# Patient Record
Sex: FEMALE | Race: WHITE | Employment: FULL TIME | URBAN - METROPOLITAN AREA
[De-identification: names, ages, dates, MRNs, and addresses within clinical notes are randomized per-mention and may not be internally consistent; named-entity substitution may affect disease eponyms.]

---

## 2018-10-22 NOTE — PROGRESS NOTES
[address] [facility]  THERAPY AT [facility], [address] [facility] [address] - Phone: [phone]  Fax: [phone] / [address] [facility]  Patient Name: Samuel Eng : 1965   Medical   Diagnosis: Left shoulder pain [M25.512]  Bicipital tendinitis, left shoulder [M75.22]  Strain of muscle(s) and tendon(s) of the rotator cuff of left shoulder, initial encounter [S46.012A]  Adhesive capsulitis of left shoulder [M75.02] Treatment Diagnosis: Left shoulder pain   Bicipital tendinitis, left shoulder   Strain of muscle(s) and tendon(s) of the rotator cuff of left shoulder, initial encounter   Adhesive capsulitis of left shoulder   Onset Date: 2018     Referral Source: Thereasa Burkitt, MD [facility], [place]): 10/23/2018   Prior Hospitalization: See medical history Provider #: [id number]   Prior Level of Function: Independent and pain free ADL's, IADL's, and recreational activity   Comorbidities: No significant PMH   Medications: Verified on Patient Summary List   The Plan of Care and following information is based on the information from the initial evaluation.    ==================================================================================  Assessment / key information: Patient is 47 y.o. female who presents to [facility] with diagnosis of Left shoulder pain [M25.512]  Bicipital tendinitis, left shoulder [M75.22]  Strain of muscle(s) and tendon(s) of the rotator cuff of left shoulder, initial encounter [S46.012A]  Adhesive capsulitis of left shoulder [M75.02]. Patient reports left shoulder sx began 2018 with insidious onset. Reports hx of participation in PT services 2018 with improvement in left shoulder. Reports L shoulder injection 10/18/2018 with limited relief. X-ray imaging of the shoulder was unremarkable per patient report.  Shoulder pain is located in the anterior aspect of the right shoulder and is described as a consistent ache. Patient denies numbness and tingling down the L UE. Pt rates left shoulder pain  0/10 at the best, 4/10 currently, 9/10 at the worst. Shoulder pain increases with catching, dressing, reaching behind the back, and laying in left sidelying. Upon objective evaluation, patient demonstrates impaired and painful AROM of left shoulder in FIR, flexion, and ER, impaired strength in supraspinatus and infraspinatus, and tenderness to palpation to left teres major/minor. R/L AROM as follows: flexion 172/154 deg, scaption 171/178 deg, extension 50/48 deg, FIR T6-7/T10-11 deg, ER @0 degrees of abduction 100/60 deg, and ER @45 deg L 85/40 deg. The following special tests were positive: Hawkin's Derrell, Cross Arm, and Neer's indicating probable L shoulder subacromial impingement. Patient scored 71/100 on FOTO indicating decreased function and quality of life.  Pt can benefit from PT to decrease pain and TTP, increase left shoulder ROM, strength, flexibility, capsular mobility to improve functional ability.     ==================================================================================  Problem List: pain affecting function, decrease ROM, decrease strength, edema affecting function, decrease ADL/ functional abilities, decrease activity tolerance, decrease flexibility/ joint mobility, dry needling, and decrease transfer abilities   Treatment Plan may include any combination of the following: Therapeutic exercise, Therapeutic activities, Neuromuscular re-education, Physical agent/modality, Manual therapy and Patient education  Patient / Family readiness to learn indicated by: asking questions, trying to perform skills and interest  Persons(s) to be included in education: patient (P)  Barriers to Learning/Limitations: no  Measures taken:    Patient Goal (s): \"figure out how to get better and stay better to be able to get dressed pain free\"   Patient self reported health status: good  Rehabilitation Potential: good   Short Term Goals: To be accomplished in  2  weeks:  1. Pt performing HEP. 2.  Patient will report decreased c/o pain to < or = 6/10 at the worst to facilitate improved ease with sleeping in left sidelying with manageable sx in the left shoulder. 3.  Increase left Sh AROM in flexion to 165 to facilitate improved ease with household chores. 4. Patient to report 50% improvement in donning and doffing jacket.  Long Term Goals: To be accomplished in  4  weeks:  1. Pt independent in HEP. 2.  Increase score on FOTO to 74/100 indicating improved function. 3.  Increase left Sh strength in ER and supraspinatus to 5-/5 to facilitate improved ease with participation in gym routine. 4.  Increase left Sh AROM in FIR to T6/7 to facilitate improved ease with dressing. 5.  Increase left Sh AROM in ER @ 0 deg ABD to 90 deg to facilitate improved ease with catching. 6. Patient to demonstrate pain free and unrestricted throwing with left shoulder in order to facilitate improved ease with playing catch with child.    Frequency / Duration:   Patient to be seen  2-3  times per week for 3-4  weeks:  Patient / Caregiver education and instruction: exercises  G-Codes (GP): n/a  Eval Complexity: History: LOW Complexity : Zero comorbidities / personal factors that will impact the outcome / POCExam:HIGH Complexity : 4+ Standardized tests and measures addressing body structure, function, activity limitation and / or participation in recreation  Presentation: MEDIUM Complexity : Evolving with changing characteristics  Clinical Decision Making:MEDIUM Complexity : FOTO score of 26-74Overall Complexity:LOW     Therapist Signature: Louise Duenas Date: 83/16/1867   Certification Period: n/a Time: 6:00 PM   ==================================================================================I certify that the above Physical Therapy Services are being furnished while the patient is under my care.  I agree with the treatment plan and certify that this therapy is necessary. Physician Signature:        Date:       Time:     Please sign and return to [facility] or you may fax the signed copy to [phone]. Thank you.

## 2018-10-22 NOTE — PROGRESS NOTES
PHYSICAL THERAPY - DAILY TREATMENT NOTE    Patient Name: Sada Thorpe        Date: 10/23/2018  : 1965   YES Patient  Verified  Visit #:     Insurance: Payor: Maye Finn / Plan: [address] PT / Product Type: Commerical /      In time: 9:32 am Out time: 10:13 am   Total Treatment Time: 39     BCBS and Medicare Time Tracking (below)   Total Timed Codes (min):  n/a 1:1 Treatment Time:  n/a     TREATMENT AREA =  Left shoulder pain [M25.512]  Bicipital tendinitis, left shoulder [M75.22]  Strain of muscle(s) and tendon(s) of the rotator cuff of left shoulder, initial encounter [S46.012A]  Adhesive capsulitis of left shoulder [M75.02]  SUBJECTIVE  Pain Level (on 0 to 10 scale):  0  / 10   Medication Changes/New allergies or changes in medical history, any new surgeries or procedures? NO    If yes, update Summary List   Subjective Functional Status/Changes:  []  No changes reported     See POC     OBJECTIVE      10 min Therapeutic Exercise:  [x]  See flow sheet   []  Other:      [x]  Added:  Rep L sh horizontal ADD 10-20x 6-8x/day   to improve (function):    []  Changed:     Rationale: To increase ROM, flexibility, and increase strength to improve the patients ability to don and doff jacket     min Patient Education:  YES  Reviewed HEP   []  Progressed/Changed HEP based on: Other Objective/Functional Measures:   Physical Therapy Evaluation Cervical Spine - [facility]    General Health:  Red Flags Indicated? [] Yes    [] No  [] Yes [x] No Recent weight change (If yes, due to dieting?  [] Yes  [] No)   [] Yes [x] No Unremitting pain at night  [] Yes [x] No Dizziness  [] Yes [x] No Blurred vision  [] Yes [x] No Ringing in ears  [] Yes [x] No Difficulty swallowing  [] Yes [x] No Dysfunction of bowel or bladder  [] Yes [x] No Jaw pain    Past History/Treatments:    Diagnostic Tests: [] Lab work [x] X-rays    [] CT [] MRI     [] Other:  Results: Unremarkable     Headaches: Do you have headaches? [] Yes   [x] No    OBJECTIVE  Posture: [x] WNL  Shoulder/Scapular Screen: [] WNL    [x] Abnormal:    Active Movements:   ROM deg AROM %  Comments:pain, area   Forward flexion 34     Extension 58     SB right 23     SB left  18     Rotation right 76     Rotation left 70     Retraction 100%     Protraction 100%     R/L Sh Flexion 172/154     R/L Sh Scaption 171/178     R/L Sh FIR T6-7/T10-11     R/L SH ER @ 0 deg /70     R/L Shoulder Ext 50/48     Sh ER @45  85/40 PROM 45      Neuro Screen (myotome/dematome/felexes): [] WNL  C3 - T2 CHAITANYA UE light touch sensation WNLs  Myotome Level Muscle Test Myotome Level Muscle Test   C5  C5,6  Shoulder Abd - R/L 5-/4-  Biceps - R/L 5-/5- C8 Finger Flexors - R/L 5-/5-   C6  C7  Wrist Extension - R/L 5-/5-  Wrist Flexion - R/L 5-/4 T1 Finger Abduction - Interossei R/L 4+/4   C7 Elbow Extension - R/L 5/5       Special Tests:  Cervical:        Spurling's:  [] R    [] L    [] +    [x] -       Distraction:  [] R    [] L    [] +    [x] -       Compression: [] R    [] L    [] +    [x] -    Muscle Flexibility: [] N/A   Infraspinatus: [] WNL    [x] Tight    [] R    [x] L   Teres Major: [] WNL    [x] Tight    [] R    [x] L   Teres Minor: [] WNL    [x] Tight    [] R    [x] L    Global Muscular Weakness: [] N/A   Int Rotators: R/L 5/5   Ext Rotators: R/L 5/4+              Supraspinatus: R/L 5-/4p!   Cross Arm        [x] Pos   [] Neg   Hawkin's Test  [x] Pos   [] Neg   Neer's Test  [x] Pos   [] Neg   Lift Off Test      [] Pos   [x] Neg   Empty Can  [] Pos   [x] Neg     Justification for Eval Complexity:   Patient History: LOW Complexity : Zero comorbidities / personal factors that will impact the outcome / POC (No significant PMH)  Examination:HIGH Complexity : 4+ Standardized tests and measures addressing body structure, function, activity limitation and / or participation in recreation  (See above and POC)  Clinical Presentation: MEDIUM Complexity : Evolving with changing characteristics  (pain level 3/10 on average and 9/10 @ worst, constant pain)  Clinical Decision Making:MEDIUM Complexity : FOTO score of 26-74 (Foto 71/100)  Overall Complexity:LOW      Post Treatment Pain Level (on 0 to 10) scale:   0  / 10     ASSESSMENT  Assessment/Changes in Function:     See POC     []  See Progress Note/Recertification   Patient will continue to benefit from skilled PT services to modify and progress therapeutic interventions, address functional mobility deficits, address ROM deficits, address strength deficits, analyze and address soft tissue restrictions, analyze and cue movement patterns, analyze and modify body mechanics/ergonomics and assess and modify postural abnormalities to attain remaining goals.    Progress toward goals / Updated goals:    See POC     PLAN  [x]  Upgrade activities as tolerated YES Continue plan of care   []  Discharge due to :    []  Other:      Therapist: Kim Alston DPT     Date: 10/23/2018 Time: 6:03 PM     Future Appointments   Date Time Provider [facility]   10/23/2018  9:30 AM Melissa Ibarra [facility]

## 2018-10-23 ENCOUNTER — HOSPITAL ENCOUNTER (OUTPATIENT)
Dept: PHYSICAL THERAPY | Age: 53
Discharge: HOME | End: 2018-10-23
Payer: COMMERCIAL

## 2018-10-23 PROCEDURE — 97110 THERAPEUTIC EXERCISES: CPT

## 2018-10-23 PROCEDURE — 97161 PT EVAL LOW COMPLEX 20 MIN: CPT

## 2018-10-25 ENCOUNTER — HOSPITAL ENCOUNTER (OUTPATIENT)
Dept: PHYSICAL THERAPY | Age: 53
Discharge: HOME | End: 2018-10-25
Payer: COMMERCIAL

## 2018-10-25 PROCEDURE — 97110 THERAPEUTIC EXERCISES: CPT

## 2018-10-25 PROCEDURE — 97140 MANUAL THERAPY 1/> REGIONS: CPT

## 2018-10-25 NOTE — PROGRESS NOTES
PHYSICAL THERAPY - DAILY TREATMENT NOTE    Patient Name: Marguerite Julio        Date: 10/25/2018  : 1965   YES Patient  Verified  Visit #:   2   of   9  Insurance: Payor: Remington Garduno / [facility]:  [address] [facility] / Product Type: Commerical /      In time: 9:02 Out time: 9:43   Total Treatment Time: 39     BCBS and Medicare Time Tracking (below)   Total Timed Codes (min):  41 1:1 Treatment Time:  41     TREATMENT AREA =  Shoulder pain, left [M25.512]  Bicipital tendinitis, left shoulder [M75.22]  Strain of muscle(s) and tendon(s) of the rotator cuff of left shoulder, initial encounter [S46.012A]  Adhesive capsulitis of left shoulder [M75.02]    SUBJECTIVE  Pain Level (on 0 to 10 scale):  0  / 10   Medication Changes/New allergies or changes in medical history, any new surgeries or procedures? NO    If yes, update Summary List   Subjective Functional Status/Changes:  []  No changes reported     Pt states \"It's really noticeable, the little things that I used to be aviva about I'm not as much, doing the bra up the time I get to the third hook theres a little pulling it doesn't hurt as much as I did, when I left here Tuesday night I was sore. \"          OBJECTIVE    31 min Therapeutic Exercise:  [x]  See flow sheet   []  Other:      [x]  Added:  UBE, pec stretch, and theracane    to improve (function):    []  Changed:     Rationale: To increase ROM, flexibility, and increase strength to improve the patients ability to don and doff bra. 10 min Manual Therapy: Supine STM to L UT/LS, teres major/minor, supraspinatus, and infraspinatus   Rationale:      decrease pain, increase ROM, increase tissue extensibility and decrease trigger points in shoulder to improve patient's ability to reach overhead. min Patient Education:  YES  Reviewed HEP   []  Progressed/Changed HEP based on: Other Objective/Functional Measures: Added thera cane, pec stretch, UBE with good pt tolerance and no complaints of sx. Left shoulder flexion = 160 deg  L sh FIR = T8/9   L sh ER @ 45 deg ABD = 56 deg    Post rep horizontal ADD  Left shoulder flexion = 166 deg  L sh FIR = T7/8   L sh ER @ 45 deg ABD = 56 deg      Post Treatment Pain Level (on 0 to 10) scale:   0  / 10     ASSESSMENT  Assessment/Changes in Function:     Pt presented with increased TTP and mm tone in L levator scap and teres major/minor. Presented with grossly increased L shoulder AROM and decreased pain. Patient reports 95% improvement in left shoulder. []  See Progress Note/Recertification   Patient will continue to benefit from skilled PT services to modify and progress therapeutic interventions, address functional mobility deficits, address ROM deficits, address strength deficits, analyze and address soft tissue restrictions, analyze and cue movement patterns, analyze and modify body mechanics/ergonomics, assess and modify postural abnormalities and instruct in home and community integration to attain remaining goals. Progress toward goals / Updated goals:    Progressing towards STG 1 and 4.   1. Est HEP - Goal in progress - \"Wednesday are my busy days so I didn't get as many reps in but I maybe got 4 sets in and got one set in this am\"  4. Patient to report 50% improvement in donning and doffing jacket.  Goal in progress - \"Its like 95% better\"       PLAN  [x]  Upgrade activities as tolerated YES Continue plan of care   []  Discharge due to :    []  Other:      Therapist: Parker Brewster    Date: 10/25/2018 Time: 9:09 AM     Future Appointments   Date Time Provider [facility]   10/30/2018  9:00 AM Jack PATEL [address] [place] [address] Drive   11/2/2018 10:30 AM Margarita Johnson [place] [address]   11/6/2018 10:00 AM Margarita Johnson [place] [address]   11/8/2018  2:30 PM Margarita Johnson [place] [address]   11/12/2018 10:00 AM Margarita Johnson [place] [address]   11/14/2018  1:00 PM Margarita Johnson [address]

## 2018-10-29 NOTE — PROGRESS NOTES
PHYSICAL THERAPY - DAILY TREATMENT NOTE    Patient Name: Marguerite Julio        Date: 10/30/2018  : 1965   YES Patient  Verified  Visit #:   3   of   9  Insurance: Payor: Remington Garduno / [address] / Product Type: Commerical /      In time: 9:03 am Out time: 9:52am   Total Treatment Time: 52     BCBS and Medicare Time Tracking (below)   Total Timed Codes (min):  n/a 1:1 Treatment Time:  n/a     TREATMENT AREA =  Shoulder pain, left [M25.512]  Bicipital tendinitis, left shoulder [M75.22]  Strain of muscle(s) and tendon(s) of the rotator cuff of left shoulder, initial encounter [S46.012A]  Adhesive capsulitis of left shoulder [M75.02]    SUBJECTIVE  Pain Level (on 0 to 10 scale):  0  / 10   Medication Changes/New allergies or changes in medical history, any new surgeries or procedures? NO    If yes, update Summary List   Subjective Functional Status/Changes:  []  No changes reported     Pt states \"its not sharp pain, but its not no pain, vast improvement, sometimes I get busy at work, I feel like the morning one is the one that has the most pain, when I put my coat on this morning I was like this is so nuce. \"            OBJECTIVE    40 min Therapeutic Exercise:  [x]  See flow sheet   []  Other:      [x]  Added:  Tband row, lat pull, CHAITANYA ER    to improve (function):    []  Changed:     Rationale: To increase ROM, flexibility, and increase strength to improve the patients ability to put on jacket    9 min Manual Therapy: Supine STM to L UT/LS, teres major/minor, supraspinatus, and infraspinatus   Rationale:      decrease pain, increase ROM, increase tissue extensibility and decrease trigger points in shoulder to improve patient's ability to reach overhead. min Patient Education:  YES  Reviewed HEP   []  Progressed/Changed HEP based on: Other Objective/Functional Measures: Added Tband row, lat pull, CHAITANYA ER  with good pt tolerance and no complaints of sx.    L Sh flexion AROM = 170 deg (154 degat eval)  L shoulder scaption = 5-/5 and pain free   L shoulder ER = 5-/5 and pain free     Post Treatment Pain Level (on 0 to 10) scale:   0  / 10     ASSESSMENT  Assessment/Changes in Function:     Pt presented with increased TTP and mm tone in L teres major/minor. Progressed L shoulder strengthening as appropriate. Incr L sh sx with IR @ 90 deg sh ABD thus regressed to 0 deg and BTB with improved tolerane. Lasting increases in L shoulder AROM and strength. []  See Progress Note/Recertification   Patient will continue to benefit from skilled PT services to modify and progress therapeutic interventions, address functional mobility deficits, address ROM deficits, address strength deficits, analyze and address soft tissue restrictions, analyze and cue movement patterns, analyze and modify body mechanics/ergonomics, assess and modify postural abnormalities and instruct in home and community integration to attain remaining goals. Progress toward goals / Updated goals:    1. Pt performing HEP. 2.  Patient will report decreased c/o pain to < or = 6/10 at the worst to facilitate improved ease with sleeping in left sidelying with manageable sx in the left shoulder. Goal Met = 4-5/10  3. Increase left Sh AROM in flexion to 165 to facilitate improved ease with household chores. Goal Met L sh flexion = 170 deg  4. Patient to report 50% improvement in donning and doffing jacket.  Goal >50% improvement in donning and doffing jacket          PLAN  [x]  Upgrade activities as tolerated YES Continue plan of care   []  Discharge due to :    []  Other:      Therapist: Parker Brewster    Date: 10/30/2018 Time: 7:22 PM     Future Appointments   Date Time Provider Aure Hunter   10/30/2018  9:00 AM Jack Weber [facility]   11/2/2018 10:30 AM Margarita Johnson PTA [facility]   11/6/2018 10:00 AM Margarita Johnson PTA [facility]   11/8/2018  2:30 PM Margarita Johnson PTA [facility]   11/12/2018 10:00 AM Ramakrishna Olea PTA [facility]   11/14/2018  1:00 PM Ramakrishna Olea PTA [facility]

## 2018-10-30 ENCOUNTER — HOSPITAL ENCOUNTER (OUTPATIENT)
Dept: PHYSICAL THERAPY | Age: 53
Discharge: HOME | End: 2018-10-30
Payer: COMMERCIAL

## 2018-10-30 PROCEDURE — 97110 THERAPEUTIC EXERCISES: CPT

## 2018-10-30 PROCEDURE — 97140 MANUAL THERAPY 1/> REGIONS: CPT

## 2018-11-02 ENCOUNTER — HOSPITAL ENCOUNTER (OUTPATIENT)
Dept: PHYSICAL THERAPY | Age: 53
Discharge: HOME | End: 2018-11-02
Payer: COMMERCIAL

## 2018-11-02 PROCEDURE — 97110 THERAPEUTIC EXERCISES: CPT

## 2018-11-02 PROCEDURE — 97140 MANUAL THERAPY 1/> REGIONS: CPT

## 2018-11-02 NOTE — PROGRESS NOTES
PHYSICAL THERAPY - DAILY TREATMENT NOTE    Patient Name: Girish Salter        Date: 2018  : 1965   YES Patient  Verified  Visit #:   4   of   9  Insurance: Payor: Scarlet Deep / Plan: [address] [facility] [address] PT / Product Type: Commerical /      In time: 10:56 am Out time: 11:37 am   Total Treatment Time: 41     Medicare Time Tracking (below)   Total Timed Codes (min): n/a 1:1 Treatment Time:  n/a     TREATMENT AREA =  Shoulder pain, left [M25.512]  Bicipital tendinitis, left shoulder [M75.22]  Strain of muscle(s) and tendon(s) of the rotator cuff of left shoulder, initial encounter [S46.012A]  Adhesive capsulitis of left shoulder [M75.02]    SUBJECTIVE  Pain Level (on 0 to 10 scale): 0  / 10   Medication Changes/New allergies or changes in medical history, any new surgeries or procedures? NO    If yes, update Summary List   Subjective Functional Status/Changes:  []  No changes reported     Pt states \"It only hurts when I do certain motions but I noticed today that when I do that stretch she gave me its not as sore. \"         OBJECTIVE  Modalities Rationale: n/a        32 min Therapeutic Exercise:  [x]  See flow sheet   (15 min billed)   Rationale:      increase ROM and increase strength to improve the patients ability to perform overhead reaching    9 min Manual Therapy: Supine left shoulder pec release; STM/DTM  to left shoulder complex with TrPt release to TeresGroup, Rhomboid, UT regions   Rationale:      decrease pain, increase ROM, increase tissue extensibility and decrease trigger points to improve patient's ability to improve tissue mobility in ADLs       min Patient Education:  YES  Reviewed HEP   []  Progressed/Changed HEP based on: Other Objective/Functional Measures: Add tband flexion, bilateral ER with GTB      Post Treatment Pain Level (on 0 to 10) scale:  0 / 10     ASSESSMENT  Assessment/Changes in Function:   Advanced RTC strengthening per plan of care.  Pt reporting mild fatigue with Tband ER and after tband rows. Pt education in maintaining neutral spine posture during all exercises. Noted decreased tissue mobility/increased muscle tension left pec, supraspinatus, Teres group , and UT. Pt education in use of self trigger point release, use of HEP , ice for pain management. []  See Progress Note/Recertification   Patient will continue to benefit from skilled PT services to modify and progress therapeutic interventions, address functional mobility deficits, address ROM deficits, address strength deficits, analyze and address soft tissue restrictions, analyze and cue movement patterns and instruct in home and community integration to attain remaining goals. Progress toward goals / Updated goals:  1.  Pt performing HEP.  - goal in progress  2.  Patient will report decreased c/o pain to < or = 6/10 at the worst to facilitate improved ease with sleeping in left sidelying with manageable sx in the left shoulder. Goal Met = 4-5/10  3.  Increase left Sh AROM in flexion to 165 to facilitate improved ease with household chores. Goal Met L sh flexion = 170 deg  4.  Patient to report 50% improvement in donning and doffing jacket. Goal >50% improvement in donning and doffing jacket      PLAN  []  Upgrade activities as tolerated YES Continue plan of care   []  Discharge due to :    []  Other:      Therapist: Rachel Wiley PTA    Date: 11/2/2018 Time: 11:37  AM     Future Appointments   Date Time Provider Aure Hunter   11/6/2018 10:00 AM Estephania Lema PTA [facility]   11/8/2018  2:30 PM Estephania Lema PTA [facility]   11/12/2018 10:00 AM Estephania Lema PTA [facility]

## 2018-11-06 ENCOUNTER — HOSPITAL ENCOUNTER (OUTPATIENT)
Dept: PHYSICAL THERAPY | Age: 53
Discharge: HOME | End: 2018-11-06
Payer: COMMERCIAL

## 2018-11-06 PROCEDURE — 97140 MANUAL THERAPY 1/> REGIONS: CPT

## 2018-11-06 PROCEDURE — 97110 THERAPEUTIC EXERCISES: CPT

## 2018-11-06 NOTE — PROGRESS NOTES
6PHYSICAL THERAPY - DAILY TREATMENT NOTE    Patient Name: Kalyani Bryant        Date: 2018  : 1965   YES Patient  Verified  Visit #: 5   of   9  Insurance: Payor: Brian Bottom / Plan: [address] [place] [address] Rd PT / Product Type: Commerical /      In time: 10:06 am Out time: 10:54 am   Total Treatment Time: 48     Medicare Time Tracking (below)   Total Timed Codes (min): n/a  1:1 Treatment Time:  n/a     TREATMENT AREA =  Shoulder pain, left [M25.512]  Bicipital tendinitis, left shoulder [M75.22]  Strain of muscle(s) and tendon(s) of the rotator cuff of left shoulder, initial encounter [S46.012A]  Adhesive capsulitis of left shoulder [M75.02]    SUBJECTIVE  Pain Level (on 0 to 10 scale):  0 / 10   Medication Changes/New allergies or changes in medical history, any new surgeries or procedures? NO    If yes, update Summary List   Subjective Functional Status/Changes:  []  No changes reported     Pt reports she felt it the other day doing up her bra  Did HEP with Theraband on  15 reps no difficulty. OBJECTIVE  Modalities Rationale:   N/a    33 min Therapeutic Exercise:  [x]  See flow sheet   Rationale:      increase ROM and increase strength to improve the patients ability to perform dressing, reaching behind back     10 min Manual Therapy: Supine left shoulder pec release; STM/DTM  to left shoulder complex with TrPt release to [employer], Rhomboid, UT regions   Rationale:      decrease pain, increase ROM, increase tissue extensibility and decrease trigger points to improve patient's ability to improve tissue mobility in dressing and reaching behind back      5 min Patient Education:  YES  Reviewed HEP, pt education in RTC muscle strengthening   []  Progressed/Changed HEP based on: Other Objective/Functional Measures:   Add standing 3 way shoulder flexion with 1#, prone Y, T, W for scapular strengthening     Post Treatment Pain Level (on 0 to 10) scale:   0  / 10 ASSESSMENT  Assessment/Changes in Function:   VCs for correct hand placement with proprioception standing against wall for AROM shoulder abduction. Trigger point tenderness persists left Theres group, Levator, and pec release. []  See Progress Note/Recertification   Patient will continue to benefit from skilled PT services to modify and progress therapeutic interventions, address functional mobility deficits, address ROM deficits, address strength deficits, analyze and address soft tissue restrictions, analyze and cue movement patterns and instruct in home and community integration to attain remaining goals. Progress toward goals / Updated goals:    1.  Pt performing HEP.  - goal in progress  2.  Patient will report decreased c/o pain to < or = 6/10 at the worst to facilitate improved ease with sleeping in left sidelying with manageable sx in the left shoulder. Goal Met = 4-5/10  3.  Increase left Sh AROM in flexion to 165 to facilitate improved ease with household chores. Goal Met L sh flexion = 170 deg  4.  Patient to report 50% improvement in donning and doffing jacket. Goal in progress      PLAN  []  Upgrade activities as tolerated YES Continue plan of care   []  Discharge due to :    []  Other:      Therapist: Nithya Rothman PTA    Date: 11/6/2018 Time: 10:54 AM     Future Appointments   Date Time Provider Aure Hunter   11/8/2018  2:30 PM Nicky Juarez PTA [facility]   11/12/2018 10:00 AM Nicky Juarez PTA [facility]

## 2018-11-08 ENCOUNTER — HOSPITAL ENCOUNTER (OUTPATIENT)
Dept: PHYSICAL THERAPY | Age: 53
Discharge: HOME | End: 2018-11-08
Payer: COMMERCIAL

## 2018-11-08 PROCEDURE — 97140 MANUAL THERAPY 1/> REGIONS: CPT

## 2018-11-08 PROCEDURE — 97110 THERAPEUTIC EXERCISES: CPT

## 2018-11-08 NOTE — PROGRESS NOTES
PHYSICAL THERAPY - DAILY TREATMENT NOTE    Patient Name: Sada Thorpe        Date: 2018  : 1965   YES Patient  Verified  Visit #:   6   of   9  Insurance: Payor: Maye Finn / Plan: [address] PT / Product Type: Commerical /      In time: 2:40 pm Out time: 3:45 pm   Total Treatment Time: 65     Medicare Time Tracking (below)   Total Timed Codes (min):  n/a 1:1 Treatment Time:  n/a     TREATMENT AREA =  Shoulder pain, left [M25.512]  Bicipital tendinitis, left shoulder [M75.22]  Strain of muscle(s) and tendon(s) of the rotator cuff of left shoulder, initial encounter [S46.012A]  Adhesive capsulitis of left shoulder [M75.02]    SUBJECTIVE  Pain Level (on 0 to 10 scale):  0  / 10   Medication Changes/New allergies or changes in medical history, any new surgeries or procedures? NO    If yes, update Summary List   Subjective Functional Status/Changes:  []  No changes reported     \"I feel like I'm not as cautous as I was putting on my jacket. \" difficulty putting bra on          OBJECTIVE  Modalities Rationale:   N/a     55 min Therapeutic Exercise:  [x]  See flow sheet   Rationale:      increase ROM and increase strength to improve the patients ability to perform reaching overhead and behind back      12 min Manual Therapy: Supine left shoulder pec release; STM/DTM  to left shoulder complex with TrPt release to [employer], Rhomboid, UT regions   Rationale:      decrease pain, increase ROM and increase tissue extensibility to improve patient's ability to improve tissue mobility in reaching       min Patient Education:  YES  Reviewed HEP   []  Progressed/Changed HEP based on: Other Objective/Functional Measures:    Updated written HEP  Add 2# to 3 way shoulder flexion, D2 PNF patterns, wall push ups     Post Treatment Pain Level (on 0 to 10) scale:  0  / 10     ASSESSMENT  Assessment/Changes in Function:   Mild trigger point tenderness noted in left UT, Levator and left pec regions.  Pt reporting no symptoms with RTC strengthening progressions. Pt verbalized good understanding of written HEP. []  See Progress Note/Recertification   Patient will continue to benefit from skilled PT services to modify and progress therapeutic interventions, address functional mobility deficits, address ROM deficits, address strength deficits, analyze and address soft tissue restrictions and instruct in home and community integration to attain remaining goals. Progress toward goals / Updated goals:   All goals are currently in progress  Partially met STG #4 for donning and doffing jacket     PLAN  []  Upgrade activities as tolerated YES Continue plan of care   []  Discharge due to :    []  Other:      Therapist: Marj Guerin PTA    Date: 11/8/2018 Time: 3:45  PM     Future Appointments   Date Time Provider Aure Hunter   11/8/2018  2:30 PM Tamie Feliciano PTA [facility]   11/12/2018 10:00 AM Tamie Feliciano PTA [facility]

## 2018-11-12 ENCOUNTER — HOSPITAL ENCOUNTER (OUTPATIENT)
Dept: PHYSICAL THERAPY | Age: 53
Discharge: HOME | End: 2018-11-12
Payer: COMMERCIAL

## 2018-11-12 PROCEDURE — 97110 THERAPEUTIC EXERCISES: CPT

## 2018-11-12 PROCEDURE — 97140 MANUAL THERAPY 1/> REGIONS: CPT

## 2018-11-12 NOTE — PROGRESS NOTES
PHYSICAL THERAPY - DAILY TREATMENT NOTE    Patient Name: Annalisa Franklin        Date: 2018  : 1965   YES Patient  Verified  Visit #:   7   of   9  Insurance: Payor: [employer] / Plan: [address] PT / Product Type: Commerical /      In time: 10:01 am Out time: 10:54 am   Total Treatment Time: 53     Medicare Time Tracking (below)   Total Timed Codes (min): n/a 1:1 Treatment Time:  n/a     TREATMENT AREA =  Shoulder pain, left [M25.512]  Bicipital tendinitis, left shoulder [M75.22]  Strain of muscle(s) and tendon(s) of the rotator cuff of left shoulder, initial encounter [S46.012A]  Adhesive capsulitis of left shoulder [M75.02]    SUBJECTIVE  Pain Level (on 0 to 10 scale): 0  / 10   Medication Changes/New allergies or changes in medical history, any new surgeries or procedures? NO    If yes, update Summary List   Subjective Functional Status/Changes:  []  No changes reported     It did well until there was a pop fly that went into the stands and she put her arm up to block it. The pain didn't last as long. OBJECTIVE  Modalities Rationale:  N/a    42 min Therapeutic Exercise:  [x]  See flow sheet   Rationale:      increase ROM and increase strength to improve the patients ability to perform overhead reaching and lifting     11 min Manual Therapy: Supine left shoulder pec release; STM/DTM  to left shoulder complex with TrPt release to [employer], Rhomboid, UT regions; PROM D1/D2 scapular patterns   Rationale:      decrease pain, increase ROM and increase tissue extensibility to improve patient's ability to improve tissue mobility in ADLs       min Patient Education:  YES  Reviewed HEP   []  Progressed/Changed HEP based on:        Other Objective/Functional Measures:  Increased reps 3 way shoulder flexion with 2#, CHAITANYA ER with GTB, Dx2 flexion pattern with YTB     Post Treatment Pain Level (on 0 to 10) scale:  0 / 10     ASSESSMENT  Assessment/Changes in Function:     Pt demonstrating improving co contraction and stability in rotation D2 pattern with light resistance. Mild fatigue with tband ER on left indicating pt would benefit from continued RTC strengthening. Review proper exercise techniques in all tband strengthening and wall push ups. Tightness in anterior capsule persists. Improving scapular mobility after manual.      []  See Progress Note/Recertification   Patient will continue to benefit from skilled PT services to modify and progress therapeutic interventions, address functional mobility deficits, address ROM deficits, address strength deficits, analyze and address soft tissue restrictions and instruct in home and community integration to attain remaining goals. Progress toward goals / Updated goals:  1.  Pt performing HEP.   - goal in progress  2.  Patient will report decreased c/o pain to < or = 6/10 at the worst to facilitate improved ease with sleeping in left sidelying with manageable sx in the left shoulder. Goal Met = 4-5/10  3.  Increase left Sh AROM in flexion to 165 to facilitate improved ease with household chores. Goal Met L sh flexion = 170 deg  4.  Patient to report 50% improvement in donning and doffing jacket. Goal in progress      PLAN  []  Upgrade activities as tolerated YES Continue plan of care   []  Discharge due to :    []  Other:      Therapist: Dyana Scanlon PTA    Date: 11/12/2018 Time: 10:54  AM     Future Appointments   Date Time Provider Aure Hunter   11/12/2018 10:00 AM Nell Hampton PTA [facility]

## 2018-11-14 ENCOUNTER — APPOINTMENT (OUTPATIENT)
Dept: PHYSICAL THERAPY | Age: 53
End: 2018-11-14
Payer: COMMERCIAL

## 2018-11-16 ENCOUNTER — HOSPITAL ENCOUNTER (OUTPATIENT)
Dept: PHYSICAL THERAPY | Age: 53
Discharge: HOME | End: 2018-11-16
Payer: COMMERCIAL

## 2018-11-16 PROCEDURE — 97110 THERAPEUTIC EXERCISES: CPT

## 2018-11-16 PROCEDURE — 97140 MANUAL THERAPY 1/> REGIONS: CPT

## 2018-11-16 NOTE — PROGRESS NOTES
PHYSICAL THERAPY - DAILY TREATMENT NOTE Patient Name: Bella Pacheco        Date: 2018 : 1965   YES Patient  Verified Visit #:  8   of   9  Insurance: Payor: Bhumika Jones / Plan: [facility]  CAPITATED PT / Product Type: Commerical / In time: 3:05  Out time: 4:15 pm  
Total Treatment Time: 59 Medicare Time Tracking (below) Total Timed Codes (min): n/a 1:1 Treatment Time:  n/a  
TREATMENT AREA =  Pain in left shoulder [M25.512] SUBJECTIVE Pain Level (on 0 to 10 scale): 0  / 10 Medication Changes/New allergies or changes in medical history, any new surgeries or procedures? NO    If yes, update Summary List  
Subjective Functional Status/Changes:  []  No changes reported Pt reports \"I'm feeling really good. The biggest difference getting my coat on and getting difference. OBJECTIVE Modalities Rationale:  N/a 
52 min Therapeutic Exercise:  [x]  See flow sheet Rationale:      increase ROM and increase strength to improve the patients ability to perform overhead reaching and lifting 12 min Manual Therapy: Supine left shoulder pec release; STM/DTM  to left shoulder complex with TrPt release to [employer]res[employer], Rhomboid, UT regions; PROM D1/D2 scapular patterns Rationale:      decrease pain, increase ROM and increase tissue extensibility to improve patient's ability to improve tissue mobility in ADLs 
 
 min Patient Education:  YES  Reviewed HEP []  Progressed/Changed HEP based on: Other Objective/Functional Measures: Add S/L ER Increased reps 2# 15x , advanced to table push ups x 8 reps, increased reps standing shoulder flexion x 15 reps Updated written HEP. Pt verbalized good understanding of written HEP. Post Treatment Pain Level (on 0 to 10) scale:  0 / 10 ASSESSMENT Assessment/Changes in Function: 
See prgress note [x]  See Progress Note/Recertification Patient will continue to benefit from skilled PT services to modify and progress therapeutic interventions, address functional mobility deficits, address ROM deficits, address strength deficits, analyze and address soft tissue restrictions and instruct in home and community integration to attain remaining goals. Progress toward goals / Updated goals: 
See progress note PLAN 
[]  Upgrade activities as tolerated YES Continue plan of care  
[]  Discharge due to :   
[]  Other:   
 
Therapist: Angeli Isabel PTA Date: 11/16/2018 Time: 4:15 pm  
 
No future appointments.

## 2018-11-16 NOTE — PROGRESS NOTES
[facility] PHYSICAL THERAPY AT [address] [facility] [address], [address], [place] [address] - Phone: [phone]  Fax: [phone] PROGRESS NOTE Patient Name: Mag Brar : 1965 Treatment/Medical Diagnosis: Pain in left shoulder [M25.512] Referral Source: Trey Allen MD    
Date of Initial Visit: 10-23-18 Attended Visits: 8 Missed Visits: 0  
SUMMARY OF TREATMENT Physical therapy Treatment has consisted of Therapeutic exercise for RTC ROM and strengthening, Manual Therapy, HEP, and ice. CURRENT STATUS Patient has progressed well in Physical Therapy, consistently reporting improving ROM, decreasing pain, and increased functional ability. Pt's current pain range is 0  to 7/10. Functional improvements are dressing, reaching behind back,, catching pain with movements, laying on left side, FIR: to ~ T 6 -pain   . Functional Deficits: catching, dressing, reaching behind back Pt would benefit from continued PT intervention in order to improve shoulder AROM/PROM, strength, flexibility, Functional mobility, and address remaining impairments. Goal/Measure of Progress Goal Met? 1. Pt performing HEP. Status at last Eval: dependent Current Status: Pt demonstrating good compliance with HEP. yes 2. Patient will report decreased c/o pain to < or = 6/10 at the worst to facilitate improved ease with sleeping in left sidelying with manageable sx in the left shoulder Status at last Eval: 0 to 9/10  Current Status: 0 to 7/10  Partially met 3. Increase left Sh AROM in flexion to 165 to facilitate improved ease with household chores. Status at last Eval: 154  Current Status: AROM: left shoulder flexion: 165 degreees yes 4. Patient to report 50% improvement in donning and doffing jacket. Status at last Eval: n/a Current Status: 90% improvement yes New Goals to be achieved in __4__  weeks: 1. Pt independent in HEP. 2.  Increase score on FOTO to 74/100 indicating improved function. 3.  Increase left Sh strength in ER and supraspinatus to 5-/5 to facilitate improved ease with participation in gym routine. 4.  Increase left Sh AROM in ER @ 0 deg ABD to 90 deg to facilitate improved ease with catching. 5. Patient to demonstrate pain free and unrestricted throwing with left shoulder in order to facilitate improved ease with playing catch with child RECOMMENDATIONS Plan to continue 1-2x per week x 2-3 weeks. If you have any questions/comments please contact us directly at  [phone]. Thank you for allowing us to assist in the care of your patient. LPTA Signature: Rachel Wiley PTA  Date: 11/16/2018 PT Signature: Izabella Abebe PT Time: 3:25 PM  
NOTE TO PHYSICIAN:  PLEASE COMPLETE THE ORDERS BELOW AND FAX TO [facility] Physical Therapy: [phone]. If you are unable to process this request in 24 hours please contact our office:  [phone]. 
 
___ I have read the above report and request that my patient continue as recommended.  
___ I have read the above report and request that my patient continue therapy with the following changes/special instructions:_________________________________________________________  
___ I have read the above report and request that my patient be discharged from therapy.   
 
Physician Signature:       Date:      Time:

## 2018-11-27 ENCOUNTER — HOSPITAL ENCOUNTER (OUTPATIENT)
Dept: PHYSICAL THERAPY | Age: 53
Discharge: HOME | End: 2018-11-27
Payer: COMMERCIAL

## 2018-11-27 PROCEDURE — 97110 THERAPEUTIC EXERCISES: CPT

## 2018-11-27 NOTE — PROGRESS NOTES
[facility] PHYSICAL THERAPY AT [address] [facility] [address], [address] [facility], [place], [facility] [address]  Phone: [phone]  Fax: [phone] DISCHARGE SUMMARY Patient Name: Mag Brar : 1965 Treatment/Medical Diagnosis: Pain in left shoulder [M25.512] Referral Source: Trey Allen MD    
Date of Initial Visit: 10/23/18 Attended Visits: 9 Missed Visits: 1 SUMMARY OF TREATMENT Therapeutic exercises including ROM, strengthening, stretching, manual therapy including joint and soft tissue manipulation, postural re-education, and HEP instruction. CURRENT STATUS The pt has progressed well with therapy, consistently reporting decreased pain and increased functional ability. Pt reports >/=70% improvement in left shoulder sx since initiating PT services. Average pain is 0/10 and pain at the worst is 4/10. Functional improvements include: Pain free and unrestricted playing catch, sleeping in left sidelying, and donning and doffing brassiere and jacket. Based on progress from PT services and pt reported improvement, patient is appropriate for DC to home mgt of sx at this time. Goal/Measure of Progress Goal Met? 1.  Establish HEP to prevent further disability. Status at last Eval: Goal Established Current Status: I with HEP yes 2.   Increase score on FOTO to 74/100 indicating improved function.    
Status at last Eval: FOTO = 71/100 Current Status: FOTO = 94/100 yes 3. Increase left Sh strength in ER and supraspinatus to 5-/5 to facilitate improved ease with participation in gym routine. Status at last Eval: L Shoulder ER MMT = 4+/5 L shoulder supraspinatus MMT = 4/5 p! Current Status: L Shoulder ER MMT = 5-/5 L shoulder supraspinatus MMT = 5-/5  yes 4. Increase left Sh AROM in ER @ 0 deg ABD to 90 deg to facilitate improved ease with catching. Status at last Eval: L shoulder ER @ 0 deg ABD = 70 Current Status: L shoulder ER @ 0 deg ABD = 80 Progressing 5.  Patient to demonstrate pain free and unrestricted throwing with left shoulder in order to facilitate improved ease with playing catch with child Status at last Eval: L shoulder pain with playing catch with son Current Status: Denies L shoulder pain with playing catch with son yes RECOMMENDATIONS Discontinue therapy. Progressing towards or have reached established goals. If you have any questions/comme  nts please contact us directly at [phone]. Thank you for allowing us to assist in the care of your patient. Therapist Signature: Mike Mercedes Date: 11/27/2018   Time: 8:03 AM

## 2018-11-27 NOTE — PROGRESS NOTES
PHYSICAL THERAPY - DAILY TREATMENT NOTE Patient Name: Kavin Stewart        Date: 2018 : 1965   YES Patient  Verified Visit #:  (0) 1  of   4-6  Insurance: Payor: Rossy Roland / [place]: [facility] [employer]  CAPITATED PT / Product Type: Commerical / In time: 7:59 am Out time:  8:53 am  
Total Treatment Time: 47 Medicare Time Tracking (below) Total Timed Codes (min): n/a 1:1 Treatment Time:  n/a  
TREATMENT AREA =  Pain in left shoulder [M25.512] SUBJECTIVE Pain Level (on 0 to 10 scale): 0  / 10 Medication Changes/New allergies or changes in medical history, any new surgeries or procedures? NO    If yes, update Summary List  
Subjective Functional Status/Changes:  []  No changes reported Pt reports \"the shoulder has been doing really well, I was able to play catch with my son for the first time n months, It didn't hurt, I was sore the next (both arms), muscle use, we played this past , take me a little while to warm up, it doesn't hurt and there's no pain afterwards. The only thing that sort of - when I still do my stretches there's still the tightness initially. 2-3 sets a day\" OBJECTIVE Modalities Rationale:  N/a 
54 min Therapeutic Exercise:  [x]  See flow sheet Rationale:      increase ROM and increase strength to improve the patients ability to perform overhead reaching and lifting  
 min Patient Education:  Austin Benitez []  Progressed/Changed HEP based on: Other Objective/Functional Measures: 
See DC Post Treatment Pain Level (on 0 to 10) scale:  0 / 10 ASSESSMENT Assessment/Changes in Function: 
 
See DC [x]  See Progress Note/Recertification Patient will continue to benefit from skilled PT services to modify and progress therapeutic interventions, address functional mobility deficits, address ROM deficits, address strength deficits, analyze and address soft tissue restrictions and instruct in home and community integration to attain remaining goals. Progress toward goals / Updated goals: 
 
See DC 
 
 
PLAN 
[]  Upgrade activities as tolerated YES Continue plan of care  
[]  Discharge due to :   
[]  Other:   
 
Therapist: Jayla Wooten Date: 11/27/2018 Time: 4:15 pm  
 
Future Appointments Date Time Provider Aure Hunter 11/27/2018  8:00 AM [facility]